# Patient Record
Sex: FEMALE | URBAN - METROPOLITAN AREA
[De-identification: names, ages, dates, MRNs, and addresses within clinical notes are randomized per-mention and may not be internally consistent; named-entity substitution may affect disease eponyms.]

---

## 2022-10-26 ENCOUNTER — TELEPHONE (OUTPATIENT)
Dept: INTERNAL MEDICINE CLINIC | Age: 32
End: 2022-10-26

## 2022-10-26 NOTE — TELEPHONE ENCOUNTER
----- Message from Sary Gardiner sent at 10/25/2022  5:02 PM EDT -----  Subject: Appointment Request    Reason for Call: New Patient/New to Provider Appointment needed: New   Patient Request Appointment    QUESTIONS    Reason for appointment request? No appointments available during search     Additional Information for Provider? Elaina Price called on 10/25 @ 4:53   pm. She is looking to have a new patient appointment scheduled with a   female doctor in the practice that is accepting new patients. Prefers to   be seen in the afternoon. Will bring her Tran Demetrice Paez [phone] with her to her next appointment.  Please call back with any   availability.  ---------------------------------------------------------------------------  --------------  Isi Rhodes [facility]  [phone]; OK to leave message on voicemail  ---------------------------------------------------------------------------  --------------  SCRIPT ANSWERS  COVID Screen: Moriah George

## 2023-05-23 ENCOUNTER — TELEPHONE (OUTPATIENT)
Age: 33
End: 2023-05-23

## 2023-05-23 NOTE — TELEPHONE ENCOUNTER
----- Message from Jaclyn Farr sent at 5/19/2023 12:04 PM EDT -----  Subject: Appointment Request    Reason for Call: New Patient/New to Provider Appointment needed: New   Patient Request Appointment    QUESTIONS    Reason for appointment request? No appointments available during search     Additional Information for Provider? Pt is calling in to schedule a new pt   appointment with any female provider in the office.  Please advise.   ---------------------------------------------------------------------------  --------------  Maricel MALDONADO  [phone]; OK to leave message on voicemail  ---------------------------------------------------------------------------  --------------  SCRIPT ANSWERS  COVID Screen: Pernell Wallace

## 2023-12-06 ENCOUNTER — HOSPITAL ENCOUNTER (OUTPATIENT)
Facility: HOSPITAL | Age: 33
Discharge: HOME | End: 2023-12-09
Payer: OTHER GOVERNMENT

## 2023-12-06 ENCOUNTER — OFFICE VISIT (OUTPATIENT)
Facility: CLINIC | Age: 33
End: 2023-12-06
Payer: OTHER GOVERNMENT

## 2023-12-06 VITALS
TEMPERATURE: 98.1 F | HEART RATE: 88 BPM | WEIGHT: 116 LBS | SYSTOLIC BLOOD PRESSURE: 111 MMHG | BODY MASS INDEX: 21.9 KG/M2 | OXYGEN SATURATION: 99 % | RESPIRATION RATE: 16 BRPM | DIASTOLIC BLOOD PRESSURE: 70 MMHG | HEIGHT: 61 IN

## 2023-12-06 DIAGNOSIS — Z11.59 NEED FOR HEPATITIS C SCREENING TEST: ICD-10-CM

## 2023-12-06 DIAGNOSIS — Z01.419 WELL WOMAN EXAM WITH ROUTINE GYNECOLOGICAL EXAM: ICD-10-CM

## 2023-12-06 DIAGNOSIS — Z11.4 SCREENING FOR HIV (HUMAN IMMUNODEFICIENCY VIRUS): ICD-10-CM

## 2023-12-06 DIAGNOSIS — Z13.220 SCREENING FOR LIPID DISORDERS: ICD-10-CM

## 2023-12-06 DIAGNOSIS — Z76.89 ENCOUNTER TO ESTABLISH CARE: ICD-10-CM

## 2023-12-06 DIAGNOSIS — R51.9 NONINTRACTABLE HEADACHE, UNSPECIFIED CHRONICITY PATTERN, UNSPECIFIED HEADACHE TYPE: Primary | ICD-10-CM

## 2023-12-06 DIAGNOSIS — R51.9 NONINTRACTABLE HEADACHE, UNSPECIFIED CHRONICITY PATTERN, UNSPECIFIED HEADACHE TYPE: ICD-10-CM

## 2023-12-06 DIAGNOSIS — Z86.69 HISTORY OF OPTIC NEURITIS: ICD-10-CM

## 2023-12-06 DIAGNOSIS — Z23 NEEDS FLU SHOT: ICD-10-CM

## 2023-12-06 LAB
ALBUMIN SERPL-MCNC: 4.2 G/DL (ref 3.4–5)
ALBUMIN/GLOB SERPL: 1.5 (ref 0.8–1.7)
ALP SERPL-CCNC: 52 U/L (ref 45–117)
ALT SERPL-CCNC: 32 U/L (ref 13–56)
ANION GAP SERPL CALC-SCNC: 6 MMOL/L (ref 3–18)
AST SERPL-CCNC: 17 U/L (ref 10–38)
BASOPHILS # BLD: 0.1 K/UL (ref 0–0.1)
BASOPHILS NFR BLD: 1 % (ref 0–2)
BILIRUB SERPL-MCNC: 0.8 MG/DL (ref 0.2–1)
BUN SERPL-MCNC: 11 MG/DL (ref 7–18)
BUN/CREAT SERPL: 17 (ref 12–20)
CALCIUM SERPL-MCNC: 9.3 MG/DL (ref 8.5–10.1)
CHLORIDE SERPL-SCNC: 106 MMOL/L (ref 100–111)
CHOLEST SERPL-MCNC: 171 MG/DL
CO2 SERPL-SCNC: 27 MMOL/L (ref 21–32)
CREAT SERPL-MCNC: 0.63 MG/DL (ref 0.6–1.3)
DIFFERENTIAL METHOD BLD: ABNORMAL
EOSINOPHIL # BLD: 0.1 K/UL (ref 0–0.4)
EOSINOPHIL NFR BLD: 1 % (ref 0–5)
ERYTHROCYTE [DISTWIDTH] IN BLOOD BY AUTOMATED COUNT: 11.4 % (ref 11.6–14.5)
GLOBULIN SER CALC-MCNC: 2.8 G/DL (ref 2–4)
GLUCOSE SERPL-MCNC: 72 MG/DL (ref 74–99)
HCT VFR BLD AUTO: 40.7 % (ref 35–45)
HDLC SERPL-MCNC: 64 MG/DL (ref 40–60)
HDLC SERPL: 2.7 (ref 0–5)
HGB BLD-MCNC: 13.3 G/DL (ref 12–16)
IMM GRANULOCYTES # BLD AUTO: 0 K/UL (ref 0–0.04)
IMM GRANULOCYTES NFR BLD AUTO: 0 % (ref 0–0.5)
LDLC SERPL CALC-MCNC: 94.8 MG/DL (ref 0–100)
LIPID PANEL: ABNORMAL
LYMPHOCYTES # BLD: 1.7 K/UL (ref 0.9–3.6)
LYMPHOCYTES NFR BLD: 17 % (ref 21–52)
MCH RBC QN AUTO: 31.4 PG (ref 24–34)
MCHC RBC AUTO-ENTMCNC: 32.7 G/DL (ref 31–37)
MCV RBC AUTO: 96.2 FL (ref 78–100)
MONOCYTES # BLD: 0.5 K/UL (ref 0.05–1.2)
MONOCYTES NFR BLD: 5 % (ref 3–10)
NEUTS SEG # BLD: 7.7 K/UL (ref 1.8–8)
NEUTS SEG NFR BLD: 77 % (ref 40–73)
NRBC # BLD: 0 K/UL (ref 0–0.01)
NRBC BLD-RTO: 0 PER 100 WBC
PLATELET # BLD AUTO: 284 K/UL (ref 135–420)
PMV BLD AUTO: 11.1 FL (ref 9.2–11.8)
POTASSIUM SERPL-SCNC: 3.9 MMOL/L (ref 3.5–5.5)
PROT SERPL-MCNC: 7 G/DL (ref 6.4–8.2)
RBC # BLD AUTO: 4.23 M/UL (ref 4.2–5.3)
SODIUM SERPL-SCNC: 139 MMOL/L (ref 136–145)
TRIGL SERPL-MCNC: 61 MG/DL
TSH SERPL DL<=0.05 MIU/L-ACNC: 0.61 UIU/ML (ref 0.36–3.74)
VLDLC SERPL CALC-MCNC: 12.2 MG/DL
WBC # BLD AUTO: 10 K/UL (ref 4.6–13.2)

## 2023-12-06 PROCEDURE — 36415 COLL VENOUS BLD VENIPUNCTURE: CPT

## 2023-12-06 PROCEDURE — 90471 IMMUNIZATION ADMIN: CPT

## 2023-12-06 PROCEDURE — 84443 ASSAY THYROID STIM HORMONE: CPT

## 2023-12-06 PROCEDURE — 99204 OFFICE O/P NEW MOD 45 MIN: CPT

## 2023-12-06 PROCEDURE — 80053 COMPREHEN METABOLIC PANEL: CPT

## 2023-12-06 PROCEDURE — 90674 CCIIV4 VAC NO PRSV 0.5 ML IM: CPT

## 2023-12-06 PROCEDURE — 86803 HEPATITIS C AB TEST: CPT

## 2023-12-06 PROCEDURE — 85025 COMPLETE CBC W/AUTO DIFF WBC: CPT

## 2023-12-06 PROCEDURE — 80061 LIPID PANEL: CPT

## 2023-12-06 PROCEDURE — 87389 HIV-1 AG W/HIV-1&-2 AB AG IA: CPT

## 2023-12-06 ASSESSMENT — PATIENT HEALTH QUESTIONNAIRE - PHQ9
SUM OF ALL RESPONSES TO PHQ9 QUESTIONS 1 & 2: 0
SUM OF ALL RESPONSES TO PHQ QUESTIONS 1-9: 0
SUM OF ALL RESPONSES TO PHQ QUESTIONS 1-9: 0
1. LITTLE INTEREST OR PLEASURE IN DOING THINGS: 0
SUM OF ALL RESPONSES TO PHQ QUESTIONS 1-9: 0
SUM OF ALL RESPONSES TO PHQ QUESTIONS 1-9: 0
2. FEELING DOWN, DEPRESSED OR HOPELESS: 0

## 2023-12-06 ASSESSMENT — ENCOUNTER SYMPTOMS: SHORTNESS OF BREATH: 0

## 2023-12-06 NOTE — ASSESSMENT & PLAN NOTE
Neuro exam within normal limits  Possibly r/t history of optic neuritis, referral to opththalmology and neurology for management  Possibly r/t hormonal imbalance, IUD in place, referral to gynecology for management  Check TSH to r/o thyroid dysfunction  Check CBC to r/o anemia  Re-evaluate in 2 weeks

## 2023-12-06 NOTE — PROGRESS NOTES
Elicia Blount presents today for   Chief Complaint   Patient presents with    [facility]    Headache     Patient states that she been having really bad headaches. She states that it started when she had optic neuritis. Is someone accompanying this pt? no    Is the patient using any DME equipment during [address]? no    Depression Screenin/6/2023     2:03 PM   PHQ-9 Questionaire   Little interest or pleasure in doing things 0   Feeling down, depressed, or hopeless 0   PHQ-9 Total Score 0        СВЕТЛАНА 7-Anxiety        No data to display                 Learning Assessment:  Who is the primary learner? Patient    What is the preferred language for health care of the primary learner? ENGLISH    How does the primary learner prefer to learn new concepts? DEMONSTRATION    Answered By patient    Relationship to Learner SELF    Highest level of education completed by primary learner? SOME COLLEGE         Fall Risk       No data to display                   Travel Screening:    Travel Screening     No screening recorded since 23 0000       Travel History   Travel since 23    No documented travel since 23          Health Maintenance reviewed and discussed and ordered per Provider.   Social Determinants of Health     Tobacco Use: Low Risk  (2023)    Patient History     Smoking Tobacco Use: Never     Smokeless Tobacco Use: Never     Passive Exposure: Not on file   Alcohol Use: Not on file   Financial Resource Strain: Not on file   Food Insecurity: Not on file   Transportation Needs: Not on file   Physical Activity: Not on file   Stress: Not on file   Social Connections: Not on file   Intimate Partner Violence: Not on file   Depression: Not at risk (2023)    PHQ-2     PHQ-2 Score: 0   Housing Stability: Not on file   Interpersonal Safety: Not on file   Utilities: Not on file        Health Maintenance Due   Topic Date Due    Hepatitis B vaccine (1 of 3 - 3-dose series) Never done    COVID-19
Obtained consent from patient. Per verbal order from NP [facility] Injection of Regular FLU administered. Verified by me and Jojo Ann that this is the correct immunization/injection. Patient observed for 15 minutes with no adverse reaction.
and nursing note reviewed. Constitutional:       General: She is not in acute distress. Appearance: She is not ill-appearing. HENT:      Head: Normocephalic and atraumatic. Cardiovascular:      Rate and Rhythm: Normal rate and regular rhythm. Pulmonary:      Effort: Pulmonary effort is normal. No respiratory distress. Breath sounds: No wheezing, rhonchi or rales. Musculoskeletal:         General: Normal range of motion. Skin:     General: Skin is warm and dry. Neurological:      General: No focal deficit present. Mental Status: She is alert. Cranial Nerves: No cranial nerve deficit or facial asymmetry. Sensory: No sensory deficit. Motor: No weakness or tremor. Coordination: Coordination normal. Finger-Nose-Finger Test and Heel to Shin Test normal.      Gait: Gait normal.   Psychiatric:         Mood and Affect: Mood normal.         Thought Content:  Thought content normal.         Judgment: Judgment normal.       MARLA Thomas

## 2023-12-07 LAB
HCV AB SER IA-ACNC: 0.04 INDEX
HCV AB SERPL QL IA: NEGATIVE
HEPATITIS C COMMENT: NORMAL
HIV 1+2 AB+HIV1 P24 AG SERPL QL IA: NONREACTIVE
HIV 1/2 RESULT COMMENT: NORMAL

## 2023-12-11 ENCOUNTER — TELEPHONE (OUTPATIENT)
Facility: CLINIC | Age: 33
End: 2023-12-11

## 2023-12-11 DIAGNOSIS — K58.9 IRRITABLE BOWEL SYNDROME, UNSPECIFIED TYPE: ICD-10-CM

## 2023-12-11 DIAGNOSIS — Z86.69 HISTORY OF OPTIC NEURITIS: ICD-10-CM

## 2023-12-11 DIAGNOSIS — R51.9 NONINTRACTABLE HEADACHE, UNSPECIFIED CHRONICITY PATTERN, UNSPECIFIED HEADACHE TYPE: Primary | ICD-10-CM

## 2023-12-11 NOTE — TELEPHONE ENCOUNTER
----- Message from Yuliya Lopez sent at 12/8/2023  1:47 PM EST -----  Subject: Referral Request    Reason for referral request? patient requesting for another   ophthalmologist , reason the one Che Chamorro referred her to is too   far patient refer to be referred to somewhere  [place], [place],   patient also request to be referred to a gastroenterologist reason because   she has IBS   Provider patient wants to be referred to(if known):     Provider Phone Number(if known):     Additional Information for Provider?   ---------------------------------------------------------------------------  --------------  [address]    [phone]; OK to leave message on voicemail  ---------------------------------------------------------------------------  --------------

## 2023-12-11 NOTE — TELEPHONE ENCOUNTER
----- Message from Cindy Adorno sent at 12/8/2023  1:47 PM EST -----  Subject: Referral Request    Reason for referral request? patient requesting for another   ophthalmologist , reason the one Jase Coffman referred her to is too   far patient refer to be referred to somewhere  [place], [place],   patient also request to be referred to a gastroenterologist reason because   she has IBS   Provider patient wants to be referred to(if known):     Provider Phone Number(if known):     Additional Information for Provider?   ---------------------------------------------------------------------------  --------------  [address]    [phone]; OK to leave message on voicemail  ---------------------------------------------------------------------------  --------------

## 2023-12-14 PROBLEM — Z00.00 ANNUAL PHYSICAL EXAM: Status: ACTIVE | Noted: 2023-12-14

## 2023-12-14 PROBLEM — Z00.00 ANNUAL PHYSICAL EXAM: Status: RESOLVED | Noted: 2023-12-14 | Resolved: 2023-12-14

## 2023-12-14 NOTE — TELEPHONE ENCOUNTER
Pt would like her Ophthalmology referral sent to [facility].  Waiting for a call back for GI referral. Yes

## 2024-01-11 ENCOUNTER — HOSPITAL ENCOUNTER (OUTPATIENT)
Facility: HOSPITAL | Age: 34
Discharge: HOME | End: 2024-01-11
Payer: OTHER GOVERNMENT

## 2024-01-11 DIAGNOSIS — G43.009 MIGRAINE WITHOUT AURA, NOT INTRACTABLE, WITHOUT STATUS MIGRAINOSUS: ICD-10-CM

## 2024-01-11 PROCEDURE — 6360000004 HC RX CONTRAST MEDICATION: Performed by: NURSE PRACTITIONER

## 2024-01-11 PROCEDURE — 70553 MRI BRAIN STEM W/O & W/DYE: CPT

## 2024-01-11 PROCEDURE — A9577 INJ MULTIHANCE: HCPCS | Performed by: NURSE PRACTITIONER

## 2024-01-11 RX ADMIN — GADOBENATE DIMEGLUMINE 11 ML: 529 INJECTION, SOLUTION INTRAVENOUS at 17:13

## 2024-02-27 ENCOUNTER — OFFICE VISIT (OUTPATIENT)
Age: 34
End: 2024-02-27
Payer: OTHER GOVERNMENT

## 2024-02-27 VITALS — HEIGHT: 61 IN | BODY MASS INDEX: 21.52 KG/M2 | WEIGHT: 114 LBS

## 2024-02-27 DIAGNOSIS — S43.431A LABRAL TEAR OF SHOULDER, RIGHT, INITIAL ENCOUNTER: ICD-10-CM

## 2024-02-27 DIAGNOSIS — M25.512 ACUTE PAIN OF BOTH SHOULDERS: Primary | ICD-10-CM

## 2024-02-27 DIAGNOSIS — M25.511 ACUTE PAIN OF BOTH SHOULDERS: Primary | ICD-10-CM

## 2024-02-27 PROCEDURE — 73030 X-RAY EXAM OF SHOULDER: CPT | Performed by: ORTHOPAEDIC SURGERY

## 2024-02-27 PROCEDURE — 72040 X-RAY EXAM NECK SPINE 2-3 VW: CPT | Performed by: ORTHOPAEDIC SURGERY

## 2024-02-27 PROCEDURE — 99203 OFFICE O/P NEW LOW 30 MIN: CPT | Performed by: ORTHOPAEDIC SURGERY

## 2024-02-27 RX ORDER — MELOXICAM 15 MG/1
15 TABLET ORAL DAILY
Qty: 30 TABLET | Refills: 3 | Status: SHIPPED | OUTPATIENT
Start: 2024-02-27

## 2024-02-27 SDOH — HEALTH STABILITY: PHYSICAL HEALTH: ON AVERAGE, HOW MANY MINUTES DO YOU ENGAGE IN EXERCISE AT THIS LEVEL?: 60 MIN

## 2024-02-27 SDOH — HEALTH STABILITY: PHYSICAL HEALTH: ON AVERAGE, HOW MANY DAYS PER WEEK DO YOU ENGAGE IN MODERATE TO STRENUOUS EXERCISE (LIKE A BRISK WALK)?: 2 DAYS

## 2024-02-27 NOTE — PROGRESS NOTES
Nhung Meneses  1990   Chief Complaint   Patient presents with    Shoulder Pain     Bilateral shoulder pain        HISTORY OF PRESENT ILLNESS  Nhung Meneses is a 33 y.o. female who presents today for evaluation of bilateral shoulder pain (R>L).  Pain is a 7/10. Pain has been present for years, but worse recently. The patient complains of bilateral shoulder pain that has worsened in the past couple of months. The patient describes a burning pain in her shoulders that she denies shoulder blade pain or pain that radiates down her arms. The patient denies repetitive activities. The patient describes soreness with movements. She describes difficulty with her day-to-day activities, due to her pain.     Has tried following treatments: Injections:No; Brace:No; Therapy:No; Cane/Crutch:No      No Known Allergies     Past Medical History:   Diagnosis Date    IBS (irritable bowel syndrome)     Optic nerve trauma     optic neuritis 2 years ago      Social History       Tobacco History       Smoking Status  Never      Smokeless Tobacco Use  Never              Alcohol History       Alcohol Use Status  Never              Drug Use       Drug Use Status  Never              Sexual Activity       Sexually Active  Yes Partners  Male                   No past surgical history on file.   Family History   Problem Relation Age of Onset    Lupus Mother     Arthritis Mother     Osteoporosis Mother      No current outpatient medications on file.     No current facility-administered medications for this visit.       REVIEW OF SYSTEM   Patient denies: Weight loss, Fever/Chills, HA, Visual changes, Fatigue, Chest pain, SOB, Abdominal pain, N/V/D/C, Blood in stool or urine, Edema.   Pertinent positive as above in HPI. All others were negative    PHYSICAL EXAM:   Ht 1.549 m (5' 1\")   Wt 51.7 kg (114 lb)   LMP 02/26/2024   BMI 21.54 kg/m²   The patient is a well-developed, well-nourished female   in no acute distress.  The

## 2024-02-27 NOTE — PATIENT INSTRUCTIONS
We have ordered a diagnostic test for you.  Please call the following number which is our central scheduling office to get the your test scheduled at your convenience.  Once you know your diagnostic test date please call our office back and schedule a follow-up in person with either myself or Dr. Thorne.    Central Scheduling Number: [phone]

## 2024-03-06 PROBLEM — K58.9 IBS (IRRITABLE BOWEL SYNDROME): Status: ACTIVE | Noted: 2024-02-12

## 2024-03-20 ENCOUNTER — HOSPITAL ENCOUNTER (OUTPATIENT)
Facility: HOSPITAL | Age: 34
Discharge: HOME | End: 2024-03-23
Attending: ORTHOPAEDIC SURGERY
Payer: OTHER GOVERNMENT

## 2024-03-20 DIAGNOSIS — S43.431A LABRAL TEAR OF SHOULDER, RIGHT, INITIAL ENCOUNTER: ICD-10-CM

## 2024-03-20 PROCEDURE — 2500000003 HC RX 250 WO HCPCS: Performed by: ORTHOPAEDIC SURGERY

## 2024-03-20 PROCEDURE — 2709999900 FL ARTHROGRAM INJECTION SHOULDER

## 2024-03-20 PROCEDURE — 73222 MRI JOINT UPR EXTREM W/DYE: CPT

## 2024-03-20 PROCEDURE — 2580000003 HC RX 258: Performed by: ORTHOPAEDIC SURGERY

## 2024-03-20 PROCEDURE — 6360000004 HC RX CONTRAST MEDICATION: Performed by: ORTHOPAEDIC SURGERY

## 2024-03-20 PROCEDURE — A9577 INJ MULTIHANCE: HCPCS | Performed by: ORTHOPAEDIC SURGERY

## 2024-03-20 RX ORDER — IOPAMIDOL 408 MG/ML
20 INJECTION, SOLUTION INTRATHECAL
Status: COMPLETED | OUTPATIENT
Start: 2024-03-20 | End: 2024-03-20

## 2024-03-20 RX ORDER — SODIUM CHLORIDE 0.9 % (FLUSH) 0.9 %
5-40 SYRINGE (ML) INJECTION 2 TIMES DAILY
Status: DISCONTINUED | OUTPATIENT
Start: 2024-03-20 | End: 2024-03-24 | Stop reason: HOSPADM

## 2024-03-20 RX ORDER — LIDOCAINE HYDROCHLORIDE 10 MG/ML
5 INJECTION, SOLUTION EPIDURAL; INFILTRATION; INTRACAUDAL; PERINEURAL
Status: COMPLETED | OUTPATIENT
Start: 2024-03-20 | End: 2024-03-20

## 2024-03-20 RX ADMIN — IOPAMIDOL 20 ML: 408 INJECTION, SOLUTION INTRATHECAL at 11:36

## 2024-03-20 RX ADMIN — Medication 10 ML: at 11:35

## 2024-03-20 RX ADMIN — GADOBENATE DIMEGLUMINE 0.15 ML: 529 INJECTION, SOLUTION INTRAVENOUS at 11:37

## 2024-03-20 RX ADMIN — LIDOCAINE HYDROCHLORIDE 5 ML: 10 INJECTION, SOLUTION EPIDURAL; INFILTRATION; INTRACAUDAL; PERINEURAL at 11:36

## 2024-03-26 ENCOUNTER — OFFICE VISIT (OUTPATIENT)
Age: 34
End: 2024-03-26
Payer: OTHER GOVERNMENT

## 2024-03-26 VITALS — WEIGHT: 114 LBS | HEIGHT: 61 IN | BODY MASS INDEX: 21.52 KG/M2

## 2024-03-26 DIAGNOSIS — M25.311 MULTIDIRECTIONAL INSTABILITY OF RIGHT GLENOHUMERAL JOINT: Primary | ICD-10-CM

## 2024-03-26 DIAGNOSIS — M25.50 MULTIPLE JOINT PAIN: ICD-10-CM

## 2024-03-26 PROCEDURE — 99213 OFFICE O/P EST LOW 20 MIN: CPT | Performed by: ORTHOPAEDIC SURGERY

## 2024-03-26 NOTE — PROGRESS NOTES
Nhung Meneses  1990   Chief Complaint   Patient presents with    Shoulder Pain     MRI FU RT         HISTORY OF PRESENT ILLNESS  Nhung Meneses is a 33 y.o. female who presents today for reevaluation of right shoulder and MRI arthrogram review. Pain is a 5/10. The patient continues with her existing right shoulder pain intermittently.     The patient notes pain in the joints of her right hip and knee as well.     Patient denies any fever, chills, chest pain, shortness of breath or calf pain. The remainder of the review of systems is negative. There are no new illness or injuries to report since last seen in the office. No changes in medications, allergies, social or family history.      PHYSICAL EXAM:   Ht 1.549 m (5' 1\")   Wt 51.7 kg (114 lb)   LMP 02/26/2024   BMI 21.54 kg/m²   The patient is a well-developed, well-nourished female   in no acute distress.  The patient is alert and oriented times three.  The patient is alert and oriented times three. Mood and affect are normal.  LYMPHATIC: lymph nodes are not enlarged and are within normal limits  SKIN: normal in color and non tender to palpation. There are no bruises or abrasions noted.   NEUROLOGICAL: Motor sensory exam is within normal limits. Reflexes are equal bilaterally. There is normal sensation to pinprick and light touch  MUSCULOSKELETAL:  Examination Left shoulder Right shoulder   Skin Intact Intact   AC joint tenderness - -   Biceps tenderness - -   Forward flexion/Elevation  120   Active abduction  120   Glenohumeral abduction 90 90   External rotation ROM 60 60   Internal rotation ROM 45 45   Apprehension + +   Daquan’s Relocation - -   Jerk - -   Load and Shift - -   O’briens + +   Speeds + +   Impingement sign + +   Supraspinatus/Empty Can -, 5/5 -, 5/5   External Rotation Strength -, 5/5 -, 5/5   Lift Off/Belly Press -, 5/5 -, 5/5   Neurovascular Intact Intact   Paraspinal muscle spasms present in the cervical spine     IMAGING:

## 2024-04-08 ENCOUNTER — HOSPITAL ENCOUNTER (OUTPATIENT)
Facility: HOSPITAL | Age: 34
Setting detail: RECURRING SERIES
End: 2024-04-08
Payer: OTHER GOVERNMENT

## 2024-04-11 ENCOUNTER — HOSPITAL ENCOUNTER (OUTPATIENT)
Facility: HOSPITAL | Age: 34
Setting detail: RECURRING SERIES
Discharge: HOME | End: 2024-04-14
Payer: OTHER GOVERNMENT

## 2024-04-11 PROCEDURE — 97110 THERAPEUTIC EXERCISES: CPT

## 2024-04-11 PROCEDURE — 97161 PT EVAL LOW COMPLEX 20 MIN: CPT

## 2024-04-11 PROCEDURE — 97535 SELF CARE MNGMENT TRAINING: CPT

## 2024-04-11 NOTE — PROGRESS NOTES
PHYSICAL / OCCUPATIONAL THERAPY - DAILY TREATMENT NOTE     Patient Name: Nhung Meneses    Date: 2024    : 1990  Insurance: Payor:  EAST / Plan: [facility] / Product Type: *No Product type* /      Patient  verified Yes     Visit #   Current / Total 1 24   Time   In / Out 1:58 2:30   Pain   In / Out 2 2   Subjective Functional Status/Changes: The pt reports c/o pain and catching sensation of the shoulder   Changes to:  Allergies, Med Hx, Sx Hx?   no       TREATMENT AREA =  Pain in right shoulder [M25.511]    OBJECTIVE      Therapeutic Procedures:  Tx Min Billable or 1:1 Min (if diff from Tx Min) Procedure, Rationale, Specifics   10  67392 Therapeutic Exercise (timed):  increase ROM, strength, coordination, balance, and proprioception to improve patient's ability to progress to PLOF and address remaining functional goals. (see flow sheet as applicable)    Details if applicable:       8  04375 Self Care/Home Management (timed):  improve patient knowledge and understanding of activity modification, diagnosis/prognosis, and physical therapy expectations, procedures and progression  to improve patient's ability to progress to PLOF and address remaining functional goals.  (see flow sheet as applicable)    Details if applicable:                    18  [facility] Totals Reminder: bill using total billable min of TIMED therapeutic procedures (example: do not include dry needle or estim unattended, both untimed codes, in totals to left)  8-22 min = 1 unit; 23-37 min = 2 units; 38-52 min = 3 units; 53-67 min = 4 units; 68-82 min = 5 units   Total Total     TOTAL TREATMENT TIME:        32     [x]  Patient Education billed concurrently with other procedures   [x] Review HEP    [] Progressed/Changed HEP, detail:    [] Other detail:       Objective Information/Functional Measures/Assessment    See POC    Patient will continue to benefit from skilled PT / OT services to modify and progress therapeutic

## 2024-04-11 NOTE — PROGRESS NOTES
GET [facility]  [address] [facility] [address] [place] [address] Ph:[phone] Fx: [phone]    PLAN OF CARE/ Statement of Necessity for Physical Therapy Services           Patient name: Nhung Meneses Start of Care: 2024   Referral source: Miguel Camacho,* : 1990    Medical Diagnosis: Pain in right shoulder [M25.511]       Onset Date: 23   Treatment Diagnosis: M25.511  RIGHT SHOULDER PAIN                                     Prior Hospitalization: see medical history Provider#: [id number]   Medications: Verified on Patient Summary List     Comorbidities: none reported  Prior Level of Function: functionally I with all activities, right handed    The Plan of Care and following information is based on the information from the initial evaluation.    Assessment / key information:  34 y/o female presents with c/o B shoulder pain that started about 1 year ago and has worsened over time. No injury is reported. The pt demonstrates limited right shoulder AROM flexion and IR, left shoulder is WFL. MMT reveals weakness of B shoulder complexes. + apprehension sign with the right shoulder.  The pt exhibits some instability signs as well as right shoulder RTC tendinitis. The pt will benefit from PT to address the aforementioned impairments.     Evaluation Complexity:  History:  LOW Complexity : Zero comorbidities / personal factors that will impact the outcome / POC; Examination:  MEDIUM Complexity : 3 Standardized tests and measures addressin body structure, function, activity limitation and / or participation in recreation  ;Presentation:  MEDIUM Complexity : Evolving with changing characteristics  ;Clinical Decision Making:  MEDIUM Complexity : FOTO score of 26-74 FOTO score = an established functional score where 100 = no disability  Overall Complexity Rating: LOW   Problem List: pain affecting function, decrease ROM, decrease strength, decrease

## 2024-04-19 ENCOUNTER — HOSPITAL ENCOUNTER (OUTPATIENT)
Facility: HOSPITAL | Age: 34
Setting detail: RECURRING SERIES
Discharge: HOME | End: 2024-04-22
Payer: OTHER GOVERNMENT

## 2024-04-19 PROCEDURE — 97530 THERAPEUTIC ACTIVITIES: CPT

## 2024-04-19 PROCEDURE — 97110 THERAPEUTIC EXERCISES: CPT

## 2024-04-19 PROCEDURE — 97112 NEUROMUSCULAR REEDUCATION: CPT

## 2024-04-19 NOTE — PROGRESS NOTES
to left)  8-22 min = 1 unit; 23-37 min = 2 units; 38-52 min = 3 units; 53-67 min = 4 units; 68-82 min = 5 units   Total Total     TOTAL TREATMENT TIME:        37     [x]  Patient Education billed concurrently with other procedures   [x] Review HEP    [] Progressed/Changed HEP, detail:    [] Other detail:       Objective Information/Functional Measures/Assessment    First follow up with cueing for all exercises. Patient exhibits hypermobile joints and has difficulty stabilizing through scapulae.    Patient will continue to benefit from skilled PT / OT services to modify and progress therapeutic interventions, analyze and address strength deficits, and analyze and cue for proper movement patterns to address functional deficits and attain remaining goals.    Progress toward goals / Updated goals:  []  See Progress Note/Recertification    Short Term Goals: To be accomplished in 4 weeks  The pt will  be I and compliant with HEP  IE- issued HEP   Current:has not tried yet 4/19/24  2. The pt will demonstrate 150 degrees of right shoulder AROM flexion to improve ability for reaching overhead.               IE- 138 degrees with pain  Long Term Goals: To be accomplished in 12 weeks  Improve FOTO score 71 for improved ability for functional tasks.  IE- 56  2. The pt will report no difficulty with reaching a overhead shelf.               IE- much difficulty  3. The pt will report at least 50% improvement for improved ability for her daily activities.              IE- 0  4. Improve right shoulder scaption MMT 5/5 for improved ability for lifting for job related tasks.              IE- 4+/5 with pain  5. The pt will demonstrate 4+/5 right shoulder ER for improved functional use of the right UE.               IE- 4/5    PLAN  Yes  Continue plan of care  []  Upgrade activities as tolerated  []  Discharge due to :  []  Other:    Claudine Reynolds, PT, CMTPT    4/19/2024    1:54 PM    Future Appointments   Date Time Provider Department

## 2024-04-26 ENCOUNTER — APPOINTMENT (OUTPATIENT)
Facility: HOSPITAL | Age: 34
End: 2024-04-26
Payer: OTHER GOVERNMENT

## 2024-05-01 ENCOUNTER — TELEPHONE (OUTPATIENT)
Facility: HOSPITAL | Age: 34
End: 2024-05-01

## 2024-05-01 NOTE — TELEPHONE ENCOUNTER
patient will c/b to schedule. informed her that she will need to be seen before/in 05/19/24 for it will be 30 days.

## 2024-05-17 ENCOUNTER — HOSPITAL ENCOUNTER (OUTPATIENT)
Facility: HOSPITAL | Age: 34
Setting detail: RECURRING SERIES
Discharge: HOME | End: 2024-05-20
Payer: OTHER GOVERNMENT

## 2024-05-17 PROCEDURE — 97110 THERAPEUTIC EXERCISES: CPT

## 2024-05-17 PROCEDURE — 97112 NEUROMUSCULAR REEDUCATION: CPT

## 2024-05-17 PROCEDURE — 97530 THERAPEUTIC ACTIVITIES: CPT

## 2024-05-17 NOTE — PROGRESS NOTES
GET [facility]  [address] [facility] [address] [place] [address] - Ph: [phone]   Fx: [phone]    PHYSICAL THERAPY PROGRESS NOTE      Patient name: Nhung Meneses Start of Care: 24   Referral source: Miguel Camacho,* : 1990    Medical Diagnosis: Pain in right shoulder [M25.511]  Payor:  EAST / Plan:  [employer] / Product Type: *No Product type* /  Onset Date:23    Treatment Diagnosis: M25.511 RIGHT SHOULDER PAIN    Prior Hospitalization: see medical history Provider#: [id number]   Medications: Verified on Patient summary List   Comorbidities: none reported  Prior Level of Function: functionally I with all activities, right handed    Visits from Start of Care: 3    Missed Visits: 0    Goals/Measure of Progress: To be achieved in 12 weeks:    Short Term Goals: To be accomplished in 4 weeks  The pt will  be I and compliant with HEP  IE- issued HEP   PN:reports non-compliance   2. The pt will demonstrate 150 degrees of right shoulder AROM flexion to improve ability for reaching overhead.               IE- 138 degrees with pain              PN:170 deg without pain goal met  Long Term Goals: To be accomplished in 12 weeks  Improve FOTO score 71 for improved ability for functional tasks.  IE- 56  PN:62  2. The pt will report no difficulty with reaching a overhead shelf.               IE- much difficulty              PN:some difficulty   3. The pt will report at least 50% improvement for improved ability for her daily activities.              IE- 0              PN:0%  4. Improve right shoulder scaption MMT 5/5 for improved ability for lifting for job related tasks.              IE- 4+/5 with pain              PN:5/5 with slight pain in left  5. The pt will demonstrate 4+/5 right shoulder ER for improved functional use of the right UE.               IE- 4/5              PN:4/5 no change    Summary of Care/ Key Functional Changes: 32

## 2024-05-17 NOTE — PROGRESS NOTES
PHYSICAL / OCCUPATIONAL THERAPY - DAILY TREATMENT NOTE (updated )    Patient Name: Nhung Meneses    Date: 2024    : 1990  Insurance: Payor:  EAST / Plan:  [employer] / Product Type: *No Product type* /      Patient  verified Yes     Visit #   Current / Total 3 24   Time   In / Out 1032                   1112   Pain   In / Out 5 5   Subjective Functional Status/Changes: I'm sure my pain will get worse after this. Report non-compliance with HEP.   Changes to:  Meds, Allergies, Med Hx, Sx Hx?  If yes, update Summary List no       TREATMENT AREA =  Pain in right shoulder [M25.511]    OBJECTIVE  Therapeutic Procedures:  Tx Min Billable or 1:1 Min (if diff from Tx Min) Procedure, Rationale, Specifics   24  85442 Therapeutic Exercise (timed):  increase ROM, strength, coordination, balance, and proprioception to improve patient's ability to progress to PLOF and address remaining functional goals. (see flow sheet as applicable)     Details if applicable:       8  30466 Neuromuscular Re-Education (timed):  improve balance, coordination, kinesthetic sense, posture, core stability and proprioception to improve patient's ability to develop conscious control of individual muscles and awareness of position of extremities in order to progress to PLOF and address remaining functional goals. (see flow sheet as applicable)     Details if applicable:     8  77281 Therapeutic Activity (timed):  use of dynamic activities replicating functional movements to increase ROM, strength, coordination, balance, and proprioception in order to improve patient's ability to progress to PLOF and address remaining functional goals.  (see flow sheet as applicable)     Details if applicable:            Details if applicable:            Details if applicable:     40  [facility] Totals Reminder: bill using total billable min of TIMED therapeutic procedures (example: do not include dry needle or estim unattended, both untimed

## 2024-06-10 ENCOUNTER — HOSPITAL ENCOUNTER (OUTPATIENT)
Facility: HOSPITAL | Age: 34
Discharge: HOME | End: 2024-06-13
Attending: INTERNAL MEDICINE
Payer: OTHER GOVERNMENT

## 2024-06-10 DIAGNOSIS — R74.8 ELEVATED LIVER ENZYMES: ICD-10-CM

## 2024-06-10 DIAGNOSIS — R14.2 BELCHING: ICD-10-CM

## 2024-06-10 DIAGNOSIS — R10.9 ABDOMINAL CRAMPS: ICD-10-CM

## 2024-06-10 DIAGNOSIS — K58.0 IRRITABLE BOWEL SYNDROME WITH DIARRHEA: ICD-10-CM

## 2024-06-10 PROCEDURE — 76705 ECHO EXAM OF ABDOMEN: CPT

## 2025-07-01 ENCOUNTER — HOSPITAL ENCOUNTER (OUTPATIENT)
Age: 35
Discharge: HOME | End: 2025-07-01
Payer: OTHER GOVERNMENT

## 2025-07-01 LAB
ALBUMIN SERPL-MCNC: 3.7 G/DL (ref 3.4–5)
ALBUMIN/GLOB SERPL: 1.3 (ref 0.8–1.7)
ALP SERPL-CCNC: 43 U/L (ref 45–117)
ALT SERPL-CCNC: 12 U/L (ref 10–35)
AST SERPL-CCNC: 16 U/L (ref 10–38)
BILIRUB DIRECT SERPL-MCNC: <0.1 MG/DL (ref 0–0.2)
BILIRUB SERPL-MCNC: 0.2 MG/DL (ref 0.2–1)
GLOBULIN SER CALC-MCNC: 2.9 G/DL (ref 2–4)
PROT SERPL-MCNC: 6.6 G/DL (ref 6.4–8.2)

## 2025-07-01 PROCEDURE — 83516 IMMUNOASSAY NONANTIBODY: CPT

## 2025-07-01 PROCEDURE — 86364 TISS TRNSGLTMNASE EA IG CLAS: CPT

## 2025-07-01 PROCEDURE — 86258 DGP ANTIBODY EACH IG CLASS: CPT

## 2025-07-01 PROCEDURE — 36415 COLL VENOUS BLD VENIPUNCTURE: CPT

## 2025-07-01 PROCEDURE — 86708 HEPATITIS A ANTIBODY: CPT

## 2025-07-01 PROCEDURE — 86231 EMA EACH IG CLASS: CPT

## 2025-07-01 PROCEDURE — 86706 HEP B SURFACE ANTIBODY: CPT

## 2025-07-01 PROCEDURE — 80076 HEPATIC FUNCTION PANEL: CPT

## 2025-07-02 LAB — HBV SURFACE AB SERPL IA-ACNC: 421 MIU/ML

## 2025-07-03 LAB — HAV AB SER QL IA: NEGATIVE

## 2025-07-04 LAB
ENDOMYSIUM IGA SER QL: NEGATIVE
GLIADIN PEPTIDE IGA SER-ACNC: NORMAL UNITS
GLIADIN PEPTIDE IGG SER-ACNC: NORMAL UNITS
IGA SERPL-MCNC: 171 MG/DL (ref 87–352)
TTG IGA SER-ACNC: <2 U/ML (ref 0–3)
TTG IGG SER-ACNC: <2 U/ML (ref 0–5)

## 2025-07-07 LAB
ENDOMYSIUM IGA SER QL: NEGATIVE
GLIADIN PEPTIDE IGA SER-ACNC: 5 UNITS (ref 0–19)
GLIADIN PEPTIDE IGG SER-ACNC: 1 UNITS (ref 0–19)
IGA SERPL-MCNC: 171 MG/DL (ref 87–352)
TTG IGA SER-ACNC: <2 U/ML (ref 0–3)
TTG IGG SER-ACNC: <2 U/ML (ref 0–5)